# Patient Record
Sex: FEMALE | Race: ASIAN | URBAN - METROPOLITAN AREA
[De-identification: names, ages, dates, MRNs, and addresses within clinical notes are randomized per-mention and may not be internally consistent; named-entity substitution may affect disease eponyms.]

---

## 2017-10-17 ENCOUNTER — MEDICAL CORRESPONDENCE (OUTPATIENT)
Dept: HEALTH INFORMATION MANAGEMENT | Facility: CLINIC | Age: 37
End: 2017-10-17

## 2017-10-17 ENCOUNTER — TRANSFERRED RECORDS (OUTPATIENT)
Dept: HEALTH INFORMATION MANAGEMENT | Facility: CLINIC | Age: 37
End: 2017-10-17

## 2017-11-13 ENCOUNTER — TELEPHONE (OUTPATIENT)
Dept: OPHTHALMOLOGY | Facility: CLINIC | Age: 37
End: 2017-11-13

## 2017-11-28 DIAGNOSIS — H35.451: ICD-10-CM

## 2017-11-28 DIAGNOSIS — H35.011 CHANGES IN RETINAL VASCULAR APPEARANCE, RIGHT EYE: Primary | ICD-10-CM

## 2018-01-08 ENCOUNTER — ALLIED HEALTH/NURSE VISIT (OUTPATIENT)
Dept: OPHTHALMOLOGY | Facility: CLINIC | Age: 38
End: 2018-01-08
Attending: OPHTHALMOLOGY
Payer: COMMERCIAL

## 2018-01-08 DIAGNOSIS — H35.451: ICD-10-CM

## 2018-01-08 DIAGNOSIS — H35.011 CHANGES IN RETINAL VASCULAR APPEARANCE, RIGHT EYE: ICD-10-CM

## 2018-01-08 PROCEDURE — 40000269 ZZH STATISTIC NO CHARGE FACILITY FEE: Mod: ZF

## 2018-01-08 PROCEDURE — 92275 FFERG OU (BOTH EYES): CPT | Mod: ZF

## 2018-01-08 PROCEDURE — 92083 EXTENDED VISUAL FIELD XM: CPT | Mod: ZF

## 2018-01-08 RX ORDER — ACETAMINOPHEN 325 MG/1
325-650 TABLET ORAL DAILY
COMMUNITY

## 2018-01-08 ASSESSMENT — REFRACTION_WEARINGRX
SPECS_TYPE: SVL
OD_AXIS: 105
OS_SPHERE: +0.25
OD_CYLINDER: +1.00
OD_SPHERE: -2.25

## 2018-01-08 ASSESSMENT — VISUAL ACUITY
METHOD: SNELLEN - LINEAR
OD_CC: 20/50
OD_CC+: -1+2
CORRECTION_TYPE: GLASSES
OS_CC+: +2
OS_CC: 20/20

## 2018-01-08 ASSESSMENT — ENCOUNTER SYMPTOMS: JOINT SWELLING: 0

## 2018-01-08 NOTE — NURSING NOTE
"Referred by Dr. Emil Parkinson/PN for GVF+ffERG for rp right eye.  Pt states she first noted decreased vision right eye ~7yrs ago and was seen at [facility].  Believes was told that changes seen was due to \"scarring from previous infection type process\".  Pt has been testing each eye separately all along and worsening vision and decreasing visual field prompted new eval 2017 at .  Pt believes she is experiencing headaches from eyestrain of using only left eye even though both eyes are open.  Also concerned that when headaches occur almost daily and is present upon waking, she has little tolerance to her kids (almost 5yrs and 2 1/2yrs old at home).  If she covers left eye, then vision is limited to small central area and colors are dim/dull and letters are distorted.  Right eye only has floaters (increased in size and number) but denies flashes.  Also, if only right eye is uncovered, she has no vision in the dark.  Has never obtained a DL but not b/c of limited vision.  Increased light sensitivity.  No problems w/depth perception for curbs/stairs but has noticed bumping into things and missing objects on right side.  No GI concerns, no health concerns, no retino-toxic meds.  Denies family history of eye probs;  and older daughter wear glasses but no systemic health probs at this time.  Will schedule f/u at ; will await results.  "

## 2018-01-08 NOTE — PROGRESS NOTES
"2018     STANDARD ERG REPORT    Referring:  Dr. Emil Parkinson/[facility]    RE:  Anum Canela  MRN:  [id number]  :  1980    ERG Date:  2018    CLINICAL HISTORY: Referred by Dr. Emil Parkinson/PN for GVF+ffERG for Retinitis pigmentosa right eye.  Pt states she first noted decreased vision right eye ~7yrs ago and was seen at [facility].  Believes was told that changes seen was due to \"scarring from previous infection type process\". Complaining of  worsening vision and decreasing visual field prompted new eval 2017 at [facility].  Pt believes she is experiencing headaches from eyestrain of using only left eye even though both eyes are open.  If she covers left eye, then vision is limited to small central area and colors are dim/dull and letters are distorted. If only right eye is uncovered, she has no vision in the dark. Increased light sensitivity. Denies family history of Retinitis pigmentosa.     IMPRESSION:  1. Advanced Gregory-cone dystrophy right eye.      Visual Acuity Right Eye : 20/50-1+2, PHNI      w/MRx 10-17-17 in TF, -2.25 + 1.00x105    Visual Acuity Left Eye : 20/20+2      w/MRx 10-17-17 in TF, +0.25sph                           ALL AVERAGED  Data for Full-Field ERG Right Eye   Left Eye    Dark-Adapted Patient Normal Patient   0.01 ERG (gregory) amplitude( v) ----- 96.0-436.3 241   0.01 ERG (gregory) implicit time(ms) ----- 67.4-113.3 89.9   MMMMM      3.0 ERG (combined) a-wave amplitude( v) -4 -272.6 to -52.8 -181   3.0 ERG (combined) a-wave implicit time(ms) 16.6 14.0-18.0 15.8   3.0 ERG (combined) b-wave amplitude( v) 7 150.2-475.6 295   3.0 ERG (combined) b-wave implicit time(ms) 30.8 27.2-47.5 49.9   MMMMM      3.0 Oscillatory Potentials  Present     Light-Adapted      3.0 Flicker (30-Hz) amplitude( v) ----- 49.0-213.0 100   3.0 Flicker (30-Hz) implicit time(ms) ----- 19.8-28.5 25.8         3.0 ERG (cone) a-wave amplitude( v) ----- -94.9 to -30.2 -24   3.0 ERG (cone) a-wave implicit time(ms) ----- 13.7-18.3 14.1 "   3.0 ERG (cone) b-wave amplitude( v) ----- 52.5-274.5 127   3.0 ERG (cone) b-wave implicit time(ms) ----- 24.9-29.8 28.3         * =manipulated cursors    INTERPRETATION:       This full field electroretinogram was performed according to ISCEV standards using ZipmarkION E3 system and DTL fiber recording electrodes. The patient tolerated the testing well. The waveforms are fairly reproducible and well formed left eye, right eye were very attenuated or absent.   The normative values provided above represent the 95% confidence limits for a normal individual the age of the patient. The patient s responses are averaged.   There is significant asymmetry of the responses in between both eyes.  The right eye showed significant attenuation to  non-detectable amplitudes for the dark adapted and light adapted conditions, indicating significant dysfunction of the ching and cone system.  The left eye, In dark adapted conditions, the ching-specific responses have normal amplitudes and the implicit times are normal left eye. The maximal response, a combined ching and cone responses, have normal amplitudes in both eyes and the implicit time is normal left eye, except for mild prolonged implicit time b-wave. The bright flash (scotopic 10.0) response is not electronegative. In light adapted conditions, the 30-Hz flicker responses have a normal amplitude and the implicit time is left eye The single photopic responses are normal left eye, except for a decreased amplitude of the a-wave.     Conclusion:  This represents an abnormal electroretinogram suspecious for the diagnosis of advanced ching-cone degeneration right eye. There marked assymetry in the responses. Right eye significantly abnormal and Left eye normal, except for mild non specific electroretinogram  Changes of unclear significance.  The etiology for this is unknown and could be consistent with advanced monocular retinal dystrophy, post-inflammatory retinopathy, toxic  retinopathy.     Clinical correlation is recommended. Unfortunately I did not have retina images for correlation. At next evaluation, the patient could undergo fundus images for clinical correlation.     A  repeat electroretinogram could be considered in 1-2 years or earlier if the clinical situation changes.      Dear Dr. Emil Parkinson, thank you for the opportunity to provide electrophysiologic services for this patient.  Please do not hesitate to call if there should be any questions regarding these results.     Sanjuanita Rae MD     Retina Service   Department of Ophthalmology & Visual Neurosciences   [facility]  Phone: [phone]   Fax: [phone]

## 2018-01-08 NOTE — LETTER
"STANDARD ERG REPORT    Referring:  Emil Parkinson MD/ [facility]    RE:  Anum Canela  MRN:  [id number]  :  1980    ERG Date:  2018    CLINICAL HISTORY: Referred by Dr. Emil Parkinson/[facility] for GVF+ffERG for retinitis pigmentosa right eye.  Pt states she first noted decreased vision right eye ~7yrs ago and was seen at [facility].  Believes was told that changes seen were due to \"scarring from previous infection-type process\". Complaining of  worsening vision and decreasing visual field prompted new eval 2017 at .  Pt believes she is experiencing headaches from eyestrain of using only left eye even though both eyes are open.  If she covers left eye, then vision is limited to small central area and colors are dim/dull and letters are distorted. If only right eye is uncovered, she has no vision in the dark. Increased light sensitivity. Denies family history of retinitis pigmentosa.     IMPRESSION:   Advanced ching-cone dystrophy right eye        Visual Acuity: Right Eye: 20/50-1+2, PHNI      w/MRx 10-17-17 in TF, -2.25 + 1.00x105    Visual Acuity: Left Eye: 20/20+2      w/MRx 10-17-17 in TF, +0.25sph                                         ALL AVERAGED  Data for Full-Field ERG Right Eye   Left Eye    Dark-Adapted Patient Normal Patient   0.01 ERG (ching) amplitude( v) ----- 96.0-436.3 241   0.01 ERG (ching) implicit time(ms) ----- 67.4-113.3 89.9   MMMMM      3.0 ERG (combined) a-wave amplitude( v) -4 -272.6 to -52.8 -181   3.0 ERG (combined) a-wave implicit time(ms) 16.6 14.0-18.0 15.8   3.0 ERG (combined) b-wave amplitude( v) 7 150.2-475.6 295   3.0 ERG (combined) b-wave implicit time(ms) 30.8 27.2-47.5 49.9   MMMMM      3.0 Oscillatory Potentials  Present     Light-Adapted      3.0 Flicker (30-Hz) amplitude( v) ----- 49.0-213.0 100   3.0 Flicker (30-Hz) implicit time(ms) ----- 19.8-28.5 25.8         3.0 ERG (cone) a-wave amplitude( v) ----- -94.9 to -30.2 -24   3.0 ERG (cone) a-wave implicit time(ms) ----- " 13.7-18.3 14.1   3.0 ERG (cone) b-wave amplitude( v) ----- 52.5-274.5 127   3.0 ERG (cone) b-wave implicit time(ms) ----- 24.9-29.8 28.3                          * =manipulated cursors    INTERPRETATION:  This full-field electroretinogram was performed according to ISCEV standards using the GroundWorkION E3 system and DTL fiber-recording electrodes. The patient tolerated the testing well. The waveforms are fairly reproducible and well formed left eye, right eye very attenuated or absent. The normative values provided above represent the 95% confidence limits for a normal individual the age of the patient. The patient s responses are averaged.  There is significant asymmetry of the responses in between both eyes. The right eye showed significant attenuation to non-detectable amplitudes for the dark-adapted and light-adapted conditions, indicating significant dysfunction of the ching and cone system.    In dark-adapted conditions, the ching-specific responses have normal amplitudes and the implicit times are normal in the left eye. The maximal response, a combined ching and cone response, has normal amplitudes in both eyes and the implicit time is normal in the left eye, except for mild prolonged implicit time b-wave. The bright flash (scotopic 10.0) response is not electronegative.   In light-adapted conditions, the 30-Hz flicker responses have a normal amplitude and implicit time in the left eye The single photopic responses are normal left eye, except for a decreased amplitude of the a-wave.     CONCLUSION: This represents an abnormal electroretinogram suspicious for the diagnosis of advanced ching-cone degeneration right eye. There is marked asymmetry in the responses. Right eye is significantly abnormal and the left eye normal, except for mild nonspecific electroretinogram changes of unclear significance. The etiology for this is unknown and could be consistent with advanced monocular retinal dystrophy, post-inflammatory  retinopathy, or toxic retinopathy. Clinical correlation is recommended. Unfortunately I did not have retina images for correlation. At next evaluation, the patient could undergo fundus images for clinical correlation.     A  repeat electroretinogram could be considered in 1-2 years, or earlier if the clinical situation changes.          STANDARD GVF REPORT           GVF Date:  1/8/2018    IMPRESSION: Severe constriction of the visual field right eye; normal visual field left eye     PRELIMINARY INTERPRETATION:       Right Eye:   Fixation: Good  Findings:  Severe constriction of the visual field to less than 10 degrees     Left eye:  Fixation: Good  Blind spot: Normal size to II4e  Findings: The peripheral isopters extended horizontally 140. No scotomas were identified.        Dr. Parkinson, thank you for the opportunity to provide electrophysiologic services for this patient.  Please do not hesitate to call if there should be any questions regarding these results.    Sincerely,       Sanjuanita Rae MD     Retina Service   Department of Ophthalmology & Visual Neurosciences   [facility]  Phone: [phone]   Fax: [phone]

## 2018-01-08 NOTE — PROGRESS NOTES
"2018    STANDARD GVF REPORT    Referring: Dr. Emil Parkinson    RE: Anum Canela  MRN: [id number]  : 1980                 GVF Date:  2018    CLINICAL HISTORY: Referred by Dr. Emil Parkinson/PN for GVF+ffERG for Retinitis pigmentosa right eye.  Pt states she first noted decreased vision right eye ~7yrs ago and was seen at [facility].  Believes was told that changes seen was due to \"scarring from previous infection type process\". Complaining of  worsening vision and decreasing visual field prompted new eval 2017 at [facility].  Pt believes she is experiencing headaches from eyestrain of using only left eye even though both eyes are open.  If she covers left eye, then vision is limited to small central area and colors are dim/dull and letters are distorted. If only right eye is uncovered, she has no vision in the dark. Increased light sensitivity. Denies family history of Retinitis pigmentosa.     IMPRESSION: 1. Severe constriction of the visual field right eye; normal visual field left eye     Visual Acuity Right Eye : 20/50-1+2, PHNI      w/MRx 10-17-17 in TF, -2.25 + 1.00x105    Visual Acuity Left Eye : 20/20+2      w/MRx 10-17-17 in TF, +0.25sph        PRELIMINARY INTERPRETATION:       Right eye:   Fixation: good  Findings:  Severe constriction of the visual field to less than 10 degrees     Left eye:  Fixation: good  Blind spot: normal size to II4e  Findings: The peripheral isopters extended horizontally 140. No scotomas were identified.      Sincerely,    Sanjuanita Rae MD  .  Retina Service   Department of Ophthalmology and Visual Neurosciences   [facility]  Phone: [phone]   Fax: [phone]                       "

## 2018-01-08 NOTE — MR AVS SNAPSHOT
After Visit Summary   2018    Anum Canela    MRN: [id number]           Patient Information     Date Of Birth          1980        Visit Information        Provider Department      2018 9:30 AM [facility]        Today's Diagnoses     Changes in retinal vascular appearance, right eye        Secondary pigmentary retinal degeneration of right eye           Follow-ups after your visit        Who to contact     Please call your clinic at [phone] to:    Ask questions about your health    Make or cancel appointments    Discuss your medicines    Learn about your test results    Speak to your doctor   If you have compliments or concerns about an experience at your clinic, or if you wish to file a complaint, please contact [facility] Patient Relations at [phone] or email us at [email or web address]         Additional Information About Your Visit        MyChart Information     [employer] is an electronic gateway that provides easy, online access to your medical records. With [employer], you can request a clinic appointment, read your test results, renew a prescription or communicate with your care team.     To sign up for [employer] visit the website at [email or web address]   You will be asked to enter the access code listed below, as well as some personal information. Please follow the directions to create your username and password.     Your access code is: [email or web address]  Expires: 2018 11:09 AM     Your access code will  in 90 days. If you need help or a new code, please contact your [facility] or call [phone] for assistance.        Care EveryWhere ID     This is your Care EveryWhere ID. This could be used by other organizations to access your [facility] medical records  [id number]         Blood Pressure from Last 3 Encounters:   No data found for BP    Weight from Last 3  Encounters:   No data found for Wt              We Performed the Following     FFERG OU (both eyes)     Scott VF OU        Primary Care Provider    None Specified       No primary provider on file.        Equal Access to Services     JOSE DIETRICH : Donnell Bansal, nia cole, popeyeezra solimanhernandoharinder jefferypaigeharinder, arthur batistacollette jefferyjustin harris marcela agustin. So [facility] [phone].    ATENCIÓN: Si habla español, tiene a cano disposición servicios gratuitos de asistencia lingüística. Llame al [phone].    We comply with applicable federal civil rights laws and [place] laws. We do not discriminate on the basis of race, color, national origin, age, disability, sex, sexual orientation, or gender identity.            Thank you!     Thank you for choosing EYE CLINIC  for your care. Our goal is always to provide you with excellent care. Hearing back from our patients is one way we can continue to improve our services. Please take a few minutes to complete the written survey that you may receive in the mail after your visit with us. Thank you!             Your Updated Medication List - Protect others around you: Learn how to safely use, store and throw away your medicines at www.disposemymeds.org.          This list is accurate as of: 1/8/18 11:09 AM.  Always use your most recent med list.                   Brand Name Dispense Instructions for use Diagnosis    acetaminophen 325 MG tablet    TYLENOL     Take 325-650 mg by mouth daily Waking up w/headaches